# Patient Record
Sex: MALE | Race: WHITE | NOT HISPANIC OR LATINO | URBAN - METROPOLITAN AREA
[De-identification: names, ages, dates, MRNs, and addresses within clinical notes are randomized per-mention and may not be internally consistent; named-entity substitution may affect disease eponyms.]

---

## 2017-01-27 ENCOUNTER — NEW PATIENT (OUTPATIENT)
Dept: URBAN - METROPOLITAN AREA CLINIC 70 | Facility: CLINIC | Age: 72
End: 2017-01-27
Payer: MEDICARE

## 2017-01-27 DIAGNOSIS — Z79.84 LONG TERM (CURRENT) USE OF ORAL ANTIDIABETIC DRUGS: ICD-10-CM

## 2017-01-27 DIAGNOSIS — D31.32 BENIGN NEOPLASM OF LEFT CHOROID: ICD-10-CM

## 2017-01-27 DIAGNOSIS — E11.9 TYPE 2 DIABETES MELLITUS WITHOUT COMPLICATIONS: Primary | ICD-10-CM

## 2017-01-27 PROCEDURE — 92134 CPTRZ OPH DX IMG PST SGM RTA: CPT | Performed by: OPHTHALMOLOGY

## 2017-01-27 PROCEDURE — 92004 COMPRE OPH EXAM NEW PT 1/>: CPT | Performed by: OPHTHALMOLOGY

## 2017-01-27 ASSESSMENT — INTRAOCULAR PRESSURE
OD: 10
OS: 10

## 2019-10-23 ENCOUNTER — Encounter (OUTPATIENT)
Dept: URBAN - METROPOLITAN AREA CLINIC 72 | Facility: CLINIC | Age: 74
End: 2019-10-23
Payer: MEDICARE

## 2019-10-23 PROCEDURE — 92014 COMPRE OPH EXAM EST PT 1/>: CPT | Performed by: OPTOMETRIST

## 2020-02-25 ENCOUNTER — OFFICE VISIT (OUTPATIENT)
Dept: URBAN - METROPOLITAN AREA CLINIC 71 | Facility: CLINIC | Age: 75
End: 2020-02-25
Payer: MEDICARE

## 2020-02-25 DIAGNOSIS — H35.3131 NONEXUDATIVE AGE-RELATED MACULAR DEGENERATION, BILATERAL, EARLY DRY STAGE: ICD-10-CM

## 2020-02-25 PROCEDURE — 99214 OFFICE O/P EST MOD 30 MIN: CPT | Performed by: OPHTHALMOLOGY

## 2020-02-25 PROCEDURE — 92250 FUNDUS PHOTOGRAPHY W/I&R: CPT | Performed by: OPHTHALMOLOGY

## 2020-02-25 ASSESSMENT — INTRAOCULAR PRESSURE
OS: 13
OD: 15

## 2020-02-25 NOTE — IMPRESSION/PLAN
Impression: Nonexudative age-related macular degeneration, bilateral, early dry stage: H35.3131. Discussed in detail with patient. Recommend patient to start eye vitamins for macular health. Plan: Will continue to monitor for changes and progression.

## 2020-02-25 NOTE — IMPRESSION/PLAN
Impression: Combined forms of age-related cataract, bilateral: H25.813. Plan: OS worse than OD. Recommending patient to have cataract surgery if vision is effecting daily life activities. Gave patient a handout of surgical lens options. Patient would like to discuss option with wife and will call back. Will put patient in recall for 6 months regardless. Patient okay to call whenever to make a cataract eval appointment.

## 2020-02-25 NOTE — IMPRESSION/PLAN
Impression: Type 2 diabetes mellitus without complications: K55.0. No retinopathy seen today on physical exam or optos imaging. Patient unsure of blood sugar/A1C. Plan: Will continue to monitor.  Patient to also continue follow up with Dr. Scott Henry

## 2020-06-05 ENCOUNTER — OFFICE VISIT (OUTPATIENT)
Dept: URBAN - METROPOLITAN AREA CLINIC 72 | Facility: CLINIC | Age: 75
End: 2020-06-05
Payer: MEDICARE

## 2020-06-05 DIAGNOSIS — H25.813 COMBINED FORMS OF AGE-RELATED CATARACT, BILATERAL: Primary | ICD-10-CM

## 2020-06-05 DIAGNOSIS — H43.813 VITREOUS DEGENERATION, BILATERAL: ICD-10-CM

## 2020-06-05 PROCEDURE — 92134 CPTRZ OPH DX IMG PST SGM RTA: CPT | Performed by: OPTOMETRIST

## 2020-06-05 PROCEDURE — 92014 COMPRE OPH EXAM EST PT 1/>: CPT | Performed by: OPTOMETRIST

## 2020-06-05 PROCEDURE — 92133 CPTRZD OPH DX IMG PST SGM ON: CPT | Performed by: OPTOMETRIST

## 2020-06-05 ASSESSMENT — INTRAOCULAR PRESSURE
OD: 13
OS: 10

## 2020-06-05 ASSESSMENT — VISUAL ACUITY
OD: 20/25
OS: 20/40

## 2020-06-05 NOTE — IMPRESSION/PLAN
Impression: Presbyopia: H52.4.  Plan: OU: Discussed diagnosis in detail with patient. hold on filling glasses rx proceeding with cataract sx

## 2020-07-23 ENCOUNTER — PRE-OPERATIVE VISIT (OUTPATIENT)
Dept: URBAN - METROPOLITAN AREA CLINIC 71 | Facility: CLINIC | Age: 75
End: 2020-07-23
Payer: MEDICARE

## 2020-07-23 DIAGNOSIS — H52.223 REGULAR ASTIGMATISM, BILATERAL: ICD-10-CM

## 2020-07-23 PROCEDURE — 92014 COMPRE OPH EXAM EST PT 1/>: CPT | Performed by: OPHTHALMOLOGY

## 2020-07-23 PROCEDURE — 92136 OPHTHALMIC BIOMETRY: CPT | Performed by: OPHTHALMOLOGY

## 2020-07-23 ASSESSMENT — PACHYMETRY
OD: 3.22
OS: 3.27
OS: 26.38
OD: 26.79

## 2020-07-23 ASSESSMENT — INTRAOCULAR PRESSURE
OS: 12
OD: 12

## 2020-07-23 NOTE — IMPRESSION/PLAN
Impression: Combined forms of age-related cataract, bilateral: H25.813. Plan: OU: Discussed diagnosis in detail with patient. Discussed treatment options with patient. Discussed risks and benefits and patient understands. Advised patient of condition. Reassured patient of current condition and treatment. Discussed signs and symptoms of retinal detachment. Discussed signs and symptoms of PVD/floaters. Discussed risks of progression. New medication(s) Rx given today. Surgical treatment is required. Patient elects to have surgery. Surgical risks and benefits were discussed, explained and understood by patient. Schedule surgery in the OR. Lid scrubs and hygiene were explained. Pre op instructions given and understood. Post op instructions given and understood. Educational materials provided: Cataract and Cataract extraction/lens. CUSTOM CARE DISTANCE OU, FEMTO & ORA. TRIMOXY WITH CIPRO BID X 10 DAYS STARTING DAY AFTER SURGERY. PT WILL NEED RETINAL CLEARANCE.

## 2020-07-27 ENCOUNTER — SURGERY (OUTPATIENT)
Dept: URBAN - METROPOLITAN AREA SURGERY 45 | Facility: SURGERY | Age: 75
End: 2020-07-27
Payer: MEDICARE

## 2020-08-03 ENCOUNTER — SURGERY (OUTPATIENT)
Dept: URBAN - METROPOLITAN AREA SURGERY 44 | Facility: SURGERY | Age: 75
End: 2020-08-03
Payer: MEDICARE

## 2020-08-03 ENCOUNTER — SURGERY (OUTPATIENT)
Dept: URBAN - METROPOLITAN AREA SURGERY 45 | Facility: SURGERY | Age: 75
End: 2020-08-03
Payer: MEDICARE

## 2020-08-03 PROCEDURE — 66984 XCAPSL CTRC RMVL W/O ECP: CPT | Performed by: OPHTHALMOLOGY

## 2020-08-04 ENCOUNTER — POST-OPERATIVE VISIT (OUTPATIENT)
Dept: URBAN - METROPOLITAN AREA CLINIC 72 | Facility: CLINIC | Age: 75
End: 2020-08-04
Payer: MEDICARE

## 2020-08-04 DIAGNOSIS — Z98.41 CATARACT EXTRACTION STATUS, RIGHT EYE: Primary | ICD-10-CM

## 2020-08-04 PROCEDURE — 99024 POSTOP FOLLOW-UP VISIT: CPT | Performed by: OPTOMETRIST

## 2020-08-04 ASSESSMENT — INTRAOCULAR PRESSURE
OD: 31
OS: 15

## 2020-08-07 ENCOUNTER — POST-OPERATIVE VISIT (OUTPATIENT)
Dept: URBAN - METROPOLITAN AREA CLINIC 72 | Facility: CLINIC | Age: 75
End: 2020-08-07
Payer: MEDICARE

## 2020-08-07 PROCEDURE — 99024 POSTOP FOLLOW-UP VISIT: CPT | Performed by: OPTOMETRIST

## 2020-08-07 ASSESSMENT — INTRAOCULAR PRESSURE
OS: 10
OD: 7

## 2020-08-07 NOTE — IMPRESSION/PLAN
Impression: S/P Phaco - PC IOL LensX, ORA, LRI OD - 4 Days. Cataract extraction status, right eye  Z98.41. Plan: iol stable and in place. IOP has stabilized.  ok to proceed with second eye SX. RTC as planned for second eye

## 2020-08-10 ENCOUNTER — SURGERY (OUTPATIENT)
Dept: URBAN - METROPOLITAN AREA SURGERY 44 | Facility: SURGERY | Age: 75
End: 2020-08-10
Payer: MEDICARE

## 2020-08-10 PROCEDURE — 66984 XCAPSL CTRC RMVL W/O ECP: CPT | Performed by: OPHTHALMOLOGY

## 2020-08-10 RX ORDER — BRIMONIDINE TARTRATE 1 MG/ML
0.1 % SOLUTION/ DROPS OPHTHALMIC
Qty: 1 | Refills: 0 | Status: ACTIVE
Start: 2020-08-10

## 2020-08-11 ENCOUNTER — POST-OPERATIVE VISIT (OUTPATIENT)
Dept: URBAN - METROPOLITAN AREA CLINIC 72 | Facility: CLINIC | Age: 75
End: 2020-08-11
Payer: MEDICARE

## 2020-08-11 PROCEDURE — 99024 POSTOP FOLLOW-UP VISIT: CPT | Performed by: OPTOMETRIST

## 2020-08-11 ASSESSMENT — INTRAOCULAR PRESSURE
OS: 25
OD: 9

## 2020-08-11 NOTE — IMPRESSION/PLAN
Impression: S/P sn6at4 13.5 far. trimoxi, ora, femto OS - 1 Day. Cataract extraction status, left eye  Z98.42. Plan: iol stable and in place. Continue Ciprofloxacin one drop BID x10 days.  Recommend PT F/U as scheduled in 1 month for postop w/ ref

## 2020-08-11 NOTE — IMPRESSION/PLAN
Impression:  Cataract extraction status, right eye  Z98.41. Plan: iol stable and doing well. Finish postoperative coarse of cipro BID OD x2days.  F/U as planned for 1 month postop w/ ref

## 2020-09-02 ENCOUNTER — POST-OPERATIVE VISIT (OUTPATIENT)
Dept: URBAN - METROPOLITAN AREA CLINIC 72 | Facility: CLINIC | Age: 75
End: 2020-09-02
Payer: MEDICARE

## 2020-09-02 PROCEDURE — 99024 POSTOP FOLLOW-UP VISIT: CPT | Performed by: OPTOMETRIST

## 2020-09-02 ASSESSMENT — INTRAOCULAR PRESSURE
OS: 14
OD: 12

## 2020-09-02 ASSESSMENT — VISUAL ACUITY: OS: 20/30

## 2020-09-02 NOTE — IMPRESSION/PLAN
Impression: Cataract extraction status, right eye: Z98.41 OD. Plan: iol stable and in place.  recommend patient f/u as planned for postop w/ ref

## 2020-09-02 NOTE — IMPRESSION/PLAN
Impression: S/P sn6at4 13.5 far. trimoxi, ora, femto OS - 23 Days. Cataract extraction status, left eye  Z98.42. Plan: iol stable and in place OU. capsules clear. HX of amblyopia OS.  Recommend patient RTC as planned for 1 month postop w/ ref

## 2020-09-02 NOTE — IMPRESSION/PLAN
Impression: Vitreous degeneration, bilateral: H43.813. advised patient now that his cataracts have been removed more light is entering the eye allowing him to see his floaters better. retina flat and intact OU. Plan: The PVD is stable, and there is no evidence of a retinal tear or detachment on dilated exam.  I again reviewed the signs and symptoms of a retinal tear and detachment in detail with the patient, including worsening flashes, new floaters, and development of a shadow/curtain in the peripheral visual field. The patient was advised to call immediately with any changes to [address] or increase in symptoms.

## 2020-09-09 ENCOUNTER — POST-OPERATIVE VISIT (OUTPATIENT)
Dept: URBAN - METROPOLITAN AREA CLINIC 72 | Facility: CLINIC | Age: 75
End: 2020-09-09
Payer: MEDICARE

## 2020-09-09 DIAGNOSIS — Z98.42 CATARACT EXTRACTION STATUS, LEFT EYE: ICD-10-CM

## 2020-09-09 PROCEDURE — 99024 POSTOP FOLLOW-UP VISIT: CPT | Performed by: OPTOMETRIST

## 2020-09-09 ASSESSMENT — VISUAL ACUITY
OD: 20/20
OS: 20/20

## 2020-09-09 ASSESSMENT — INTRAOCULAR PRESSURE
OD: 9
OS: 12

## 2020-09-09 NOTE — IMPRESSION/PLAN
Impression:  Cataract extraction status, left eye  Z98.42. Plan: iol stable and in place. optional glasses rx given today patient doing well with OTC readers. recommend patient f/u in 6 months DFE capsule check.

## 2020-09-09 NOTE — IMPRESSION/PLAN
Impression:  Cataract extraction status, right eye  Z98.41. Plan: iol stable and in place. optional glasses rx given today patient doing well with OTC readers. recommend patient f/u in 6 months DFE capsule check.

## 2020-12-09 ENCOUNTER — PROCEDURE (OUTPATIENT)
Dept: URBAN - METROPOLITAN AREA CLINIC 72 | Facility: CLINIC | Age: 75
End: 2020-12-09
Payer: MEDICARE

## 2020-12-09 DIAGNOSIS — H52.4 PRESBYOPIA: Primary | ICD-10-CM

## 2020-12-09 PROCEDURE — 92015 DETERMINE REFRACTIVE STATE: CPT | Performed by: OPTOMETRIST

## 2020-12-09 ASSESSMENT — INTRAOCULAR PRESSURE
OD: 10
OS: 10

## 2020-12-09 ASSESSMENT — VISUAL ACUITY
OS: 20/20
OD: 20/20

## 2020-12-09 NOTE — IMPRESSION/PLAN
Impression: Presbyopia: H52.4. Bilateral. Plan: A new glasses Rx has been generated and dispensed. Pt to call with any concerns.

## 2021-06-17 ENCOUNTER — OFFICE VISIT (OUTPATIENT)
Dept: URBAN - METROPOLITAN AREA CLINIC 72 | Facility: CLINIC | Age: 76
End: 2021-06-17
Payer: MEDICARE

## 2021-06-17 DIAGNOSIS — H11.32 SUBCONJUNCTIVAL HEMORRHAGE OF LEFT EYE: Primary | ICD-10-CM

## 2021-06-17 DIAGNOSIS — H01.8 OTHER SPECIFIED INFLAMMATIONS OF EYELID: ICD-10-CM

## 2021-06-17 PROCEDURE — 99212 OFFICE O/P EST SF 10 MIN: CPT | Performed by: OPTOMETRIST

## 2021-06-17 ASSESSMENT — INTRAOCULAR PRESSURE
OD: 9
OS: 10

## 2021-06-17 NOTE — IMPRESSION/PLAN
Impression: Subconjunctival hemorrhage of left eye: H11.32. Plan: Discussed diagnosis in detail with patient. No treatment is required at this time. Patient instructed to use artificial tears as needed.

## 2021-06-17 NOTE — IMPRESSION/PLAN
Impression: Other specified inflammations of eyelid: H01.8. Plan: Patient instructed to use artificial tears as needed. Patient instructed to apply warm compresses.

## 2022-11-14 ENCOUNTER — OFFICE VISIT (OUTPATIENT)
Dept: URBAN - METROPOLITAN AREA CLINIC 72 | Facility: CLINIC | Age: 77
End: 2022-11-14
Payer: COMMERCIAL

## 2022-11-14 DIAGNOSIS — H26.491 OTHER SECONDARY CATARACT, RIGHT EYE: ICD-10-CM

## 2022-11-14 DIAGNOSIS — H52.4 PRESBYOPIA: Primary | ICD-10-CM

## 2022-11-14 DIAGNOSIS — H43.813 VITREOUS DEGENERATION, BILATERAL: ICD-10-CM

## 2022-11-14 PROCEDURE — 92014 COMPRE OPH EXAM EST PT 1/>: CPT | Performed by: OPTOMETRIST

## 2022-11-14 ASSESSMENT — INTRAOCULAR PRESSURE
OS: 11
OD: 10

## 2022-11-14 ASSESSMENT — VISUAL ACUITY
OD: 20/20
OS: 20/20

## 2023-11-16 ENCOUNTER — OFFICE VISIT (OUTPATIENT)
Dept: URBAN - METROPOLITAN AREA CLINIC 72 | Facility: CLINIC | Age: 78
End: 2023-11-16
Payer: COMMERCIAL

## 2023-11-16 DIAGNOSIS — H52.4 PRESBYOPIA: Primary | ICD-10-CM

## 2023-11-16 PROCEDURE — 92012 INTRM OPH EXAM EST PATIENT: CPT | Performed by: OPTOMETRIST

## 2025-02-06 ENCOUNTER — OFFICE VISIT (OUTPATIENT)
Dept: URBAN - METROPOLITAN AREA CLINIC 71 | Facility: CLINIC | Age: 80
End: 2025-02-06
Payer: COMMERCIAL

## 2025-02-06 DIAGNOSIS — H43.813 VITREOUS DEGENERATION, BILATERAL: ICD-10-CM

## 2025-02-06 DIAGNOSIS — H04.123 DRY EYE SYNDROME OF BILATERAL LACRIMAL GLANDS: ICD-10-CM

## 2025-02-06 DIAGNOSIS — H52.4 PRESBYOPIA: Primary | ICD-10-CM

## 2025-02-06 DIAGNOSIS — Z96.1 PRESENCE OF INTRAOCULAR LENS: ICD-10-CM

## 2025-02-06 DIAGNOSIS — H26.491 OTHER SECONDARY CATARACT, RIGHT EYE: ICD-10-CM

## 2025-02-06 PROCEDURE — 92012 INTRM OPH EXAM EST PATIENT: CPT

## 2025-02-06 ASSESSMENT — INTRAOCULAR PRESSURE
OD: 10
OS: 14

## 2025-02-06 ASSESSMENT — VISUAL ACUITY
OD: 20/20
OS: 20/20

## 2025-04-08 ENCOUNTER — OFFICE VISIT (OUTPATIENT)
Dept: URBAN - METROPOLITAN AREA CLINIC 71 | Facility: CLINIC | Age: 80
End: 2025-04-08
Payer: MEDICARE

## 2025-04-08 DIAGNOSIS — E11.9 TYPE 2 DIABETES MELLITUS WITHOUT COMPLICATIONS: ICD-10-CM

## 2025-04-08 DIAGNOSIS — H35.342 MACULAR CYST, HOLE, OR PSEUDOHOLE, LEFT EYE: ICD-10-CM

## 2025-04-08 DIAGNOSIS — H35.372 PUCKERING OF MACULA, LEFT EYE: ICD-10-CM

## 2025-04-08 DIAGNOSIS — H26.491 OTHER SECONDARY CATARACT, RIGHT EYE: Primary | ICD-10-CM

## 2025-04-08 DIAGNOSIS — H43.813 VITREOUS DEGENERATION, BILATERAL: ICD-10-CM

## 2025-04-08 DIAGNOSIS — H02.839 DERMATOCHALASIS OF EYELID: ICD-10-CM

## 2025-04-08 DIAGNOSIS — Z96.1 PRESENCE OF INTRAOCULAR LENS: ICD-10-CM

## 2025-04-08 PROCEDURE — 92133 CPTRZD OPH DX IMG PST SGM ON: CPT

## 2025-04-08 PROCEDURE — 92134 CPTRZ OPH DX IMG PST SGM RTA: CPT

## 2025-04-08 PROCEDURE — 99214 OFFICE O/P EST MOD 30 MIN: CPT

## 2025-04-08 ASSESSMENT — VISUAL ACUITY
OS: 20/20
OD: 20/30

## 2025-04-08 ASSESSMENT — INTRAOCULAR PRESSURE
OD: 10
OS: 10

## 2025-06-03 ENCOUNTER — OFFICE VISIT (OUTPATIENT)
Dept: URBAN - METROPOLITAN AREA CLINIC 72 | Facility: CLINIC | Age: 80
End: 2025-06-03
Payer: MEDICARE

## 2025-06-03 DIAGNOSIS — H01.8 OTHER SPECIFIED INFLAMMATIONS OF EYELID: Primary | ICD-10-CM

## 2025-06-03 DIAGNOSIS — H02.839 DERMATOCHALASIS OF EYELID: ICD-10-CM

## 2025-06-03 PROCEDURE — 99213 OFFICE O/P EST LOW 20 MIN: CPT | Performed by: OPTOMETRIST

## 2025-06-03 ASSESSMENT — INTRAOCULAR PRESSURE
OD: 13
OS: 11

## 2025-07-08 ENCOUNTER — SURGERY (OUTPATIENT)
Dept: URBAN - METROPOLITAN AREA SURGERY 44 | Facility: SURGERY | Age: 80
End: 2025-07-08
Payer: MEDICARE

## 2025-07-08 ENCOUNTER — SURGERY (OUTPATIENT)
Dept: URBAN - METROPOLITAN AREA SURGERY 45 | Facility: SURGERY | Age: 80
End: 2025-07-08
Payer: MEDICARE

## 2025-07-08 PROCEDURE — 66821 AFTER CATARACT LASER SURGERY: CPT | Performed by: OPHTHALMOLOGY

## 2025-07-15 ENCOUNTER — POST-OPERATIVE VISIT (OUTPATIENT)
Dept: URBAN - METROPOLITAN AREA CLINIC 71 | Facility: CLINIC | Age: 80
End: 2025-07-15
Payer: MEDICARE

## 2025-07-15 DIAGNOSIS — H52.4 PRESBYOPIA: Primary | ICD-10-CM

## 2025-07-15 DIAGNOSIS — Z96.1 PRESENCE OF INTRAOCULAR LENS: ICD-10-CM

## 2025-07-15 PROCEDURE — 99024 POSTOP FOLLOW-UP VISIT: CPT

## 2025-07-15 ASSESSMENT — VISUAL ACUITY
OD: 20/20
OS: 20/25

## 2025-07-15 ASSESSMENT — INTRAOCULAR PRESSURE
OD: 9
OS: 10